# Patient Record
Sex: MALE | Race: ASIAN | NOT HISPANIC OR LATINO
[De-identification: names, ages, dates, MRNs, and addresses within clinical notes are randomized per-mention and may not be internally consistent; named-entity substitution may affect disease eponyms.]

---

## 2017-06-28 ENCOUNTER — APPOINTMENT (OUTPATIENT)
Dept: HEART AND VASCULAR | Facility: CLINIC | Age: 77
End: 2017-06-28

## 2017-09-07 ENCOUNTER — RESULT CHARGE (OUTPATIENT)
Age: 77
End: 2017-09-07

## 2017-09-08 ENCOUNTER — APPOINTMENT (OUTPATIENT)
Dept: HEART AND VASCULAR | Facility: CLINIC | Age: 77
End: 2017-09-08
Payer: MEDICARE

## 2017-09-08 VITALS
BODY MASS INDEX: 22.06 KG/M2 | WEIGHT: 127.65 LBS | DIASTOLIC BLOOD PRESSURE: 70 MMHG | OXYGEN SATURATION: 95 % | HEIGHT: 63.78 IN | TEMPERATURE: 98.4 F | SYSTOLIC BLOOD PRESSURE: 140 MMHG | HEART RATE: 72 BPM

## 2017-09-08 DIAGNOSIS — Z86.69 PERSONAL HISTORY OF OTHER DISEASES OF THE NERVOUS SYSTEM AND SENSE ORGANS: ICD-10-CM

## 2017-09-08 DIAGNOSIS — Z83.3 FAMILY HISTORY OF DIABETES MELLITUS: ICD-10-CM

## 2017-09-08 DIAGNOSIS — Z80.9 FAMILY HISTORY OF MALIGNANT NEOPLASM, UNSPECIFIED: ICD-10-CM

## 2017-09-08 DIAGNOSIS — Z78.9 OTHER SPECIFIED HEALTH STATUS: ICD-10-CM

## 2017-09-08 DIAGNOSIS — Z82.49 FAMILY HISTORY OF ISCHEMIC HEART DISEASE AND OTHER DISEASES OF THE CIRCULATORY SYSTEM: ICD-10-CM

## 2017-09-08 PROCEDURE — G0008: CPT

## 2017-09-08 PROCEDURE — 93000 ELECTROCARDIOGRAM COMPLETE: CPT

## 2017-09-08 PROCEDURE — 90662 IIV NO PRSV INCREASED AG IM: CPT

## 2017-09-08 PROCEDURE — 99214 OFFICE O/P EST MOD 30 MIN: CPT | Mod: 25

## 2017-09-08 RX ORDER — BIMATOPROST 0.1 MG/ML
0.01 SOLUTION/ DROPS OPHTHALMIC
Qty: 2 | Refills: 0 | Status: ACTIVE | COMMUNITY
Start: 2017-06-21

## 2018-07-26 PROBLEM — Z78.9 ALCOHOL USE: Status: ACTIVE | Noted: 2017-09-08

## 2018-10-31 ENCOUNTER — RX RENEWAL (OUTPATIENT)
Age: 78
End: 2018-10-31

## 2018-11-09 ENCOUNTER — RX RENEWAL (OUTPATIENT)
Age: 78
End: 2018-11-09

## 2018-11-14 ENCOUNTER — APPOINTMENT (OUTPATIENT)
Dept: HEART AND VASCULAR | Facility: CLINIC | Age: 78
End: 2018-11-14
Payer: MEDICARE

## 2018-11-14 VITALS
BODY MASS INDEX: 20.57 KG/M2 | SYSTOLIC BLOOD PRESSURE: 130 MMHG | HEART RATE: 81 BPM | DIASTOLIC BLOOD PRESSURE: 70 MMHG | WEIGHT: 121.98 LBS | OXYGEN SATURATION: 99 % | TEMPERATURE: 97.6 F | HEIGHT: 64.57 IN

## 2018-11-14 DIAGNOSIS — Z78.9 OTHER SPECIFIED HEALTH STATUS: ICD-10-CM

## 2018-11-14 PROCEDURE — G0009: CPT

## 2018-11-14 PROCEDURE — 99214 OFFICE O/P EST MOD 30 MIN: CPT

## 2018-11-14 PROCEDURE — 90662 IIV NO PRSV INCREASED AG IM: CPT

## 2018-11-14 PROCEDURE — 93000 ELECTROCARDIOGRAM COMPLETE: CPT

## 2018-11-14 PROCEDURE — G0008: CPT

## 2018-11-14 PROCEDURE — 36415 COLL VENOUS BLD VENIPUNCTURE: CPT

## 2018-11-14 PROCEDURE — 90670 PCV13 VACCINE IM: CPT

## 2018-11-14 NOTE — PHYSICAL EXAM
[General Appearance - Well Developed] : well developed [Normal Appearance] : normal appearance [Well Groomed] : well groomed [General Appearance - Well Nourished] : well nourished [No Deformities] : no deformities [General Appearance - In No Acute Distress] : no acute distress [Normal Conjunctiva] : the conjunctiva exhibited no abnormalities [Eyelids - No Xanthelasma] : the eyelids demonstrated no xanthelasmas [Normal Oral Mucosa] : normal oral mucosa [No Oral Pallor] : no oral pallor [No Oral Cyanosis] : no oral cyanosis [Normal Jugular Venous A Waves Present] : normal jugular venous A waves present [Normal Jugular Venous V Waves Present] : normal jugular venous V waves present [No Jugular Venous Avina A Waves] : no jugular venous avina A waves [Respiration, Rhythm And Depth] : normal respiratory rhythm and effort [Exaggerated Use Of Accessory Muscles For Inspiration] : no accessory muscle use [Auscultation Breath Sounds / Voice Sounds] : lungs were clear to auscultation bilaterally [Heart Rate And Rhythm] : heart rate and rhythm were normal [Heart Sounds] : normal S1 and S2 [Abdomen Soft] : soft [Abdomen Tenderness] : non-tender [Abdomen Mass (___ Cm)] : no abdominal mass palpated [Abnormal Walk] : normal gait [Gait - Sufficient For Exercise Testing] : the gait was sufficient for exercise testing [Nail Clubbing] : no clubbing of the fingernails [Cyanosis, Localized] : no localized cyanosis [Petechial Hemorrhages (___cm)] : no petechial hemorrhages [Skin Color & Pigmentation] : normal skin color and pigmentation [] : no rash [No Venous Stasis] : no venous stasis [Skin Lesions] : no skin lesions [No Skin Ulcers] : no skin ulcer [No Xanthoma] : no  xanthoma was observed [Oriented To Time, Place, And Person] : oriented to person, place, and time [Affect] : the affect was normal [Mood] : the mood was normal [No Anxiety] : not feeling anxious [FreeTextEntry1] : 1/6 apical systolic murmur

## 2018-11-14 NOTE — IMPRESSION
[FreeTextEntry1] : Normal EF and wall motion\par Normal myocardial perfusion imaging(Summed stress score of zero)\par EKG changes noted but at a very high HR, most likely a false positive. They resolve within the fisrt minute of recovery. \par

## 2018-11-14 NOTE — ASSESSMENT
[FreeTextEntry1] : SOBOE- s/p +CPET but NL MPI, HR goes very high, I favor a BB but pt defers.  It would also be good for his palps due to APCs and VPCs.  Pt reports that the SOB is much better in the cold weather.\par \par HTN- Stable\par \par HLD- Continue statin\par \par T2D- On Metformin from MD at [facility]

## 2018-11-14 NOTE — REASON FOR VISIT
[Hyperlipidemia] : hyperlipidemia [Hypertension] : hypertension [FreeTextEntry1] : originally seen for SOB\par + CPET\par (-)MPI but HR of 186, \par \par Also c/o palps, noted to be APCs and VPCs.  EF NL\par \par EKG: NSR, normal axis and intervals, no ST-Tw abnormalities. 11/14/18\par \par Flu shot today, Prevnar 13, pt reports Pneumovax with in last 10 yrs with Dr Pabon.

## 2018-11-14 NOTE — HISTORY OF PRESENT ILLNESS
[FreeTextEntry1] : formerly a pt of Dr Tan\par \par Ophtho- Dr Cho\par PCP- Dr Clayton\par GI Dr Brayden Marquis colonoscopy 2016

## 2018-11-14 NOTE — REVIEW OF SYSTEMS
[Dyspnea on exertion] : dyspnea during exertion [Palpitations] : palpitations [Joint Pain] : joint pain

## 2018-11-14 NOTE — PROCEDURE
[Tc-99m, sestamibi-Cardiolite, to 40mCi, dose-Radionuclides-[employer] code--v.1-Active-Trade] : Tc-99m, sestamibi-Cardiolite, to 40mCi, dose-Radionuclides-[employer] code--v.1-Active-Trade [ECG Atrial Arrhythmias] : yes [Normal] : no significant [de-identified] : Dr. Chan Rodriguez [de-identified] : Wilber Coulter [de-identified] : SOBOE, DM(II), HTN, HLD. [de-identified] : 10.0 [de-identified] : 27.8.cailin [de-identified] : 66 [de-identified] : 160/82 [de-identified] : 6:05 [de-identified] : 183 [de-identified] : 192/76 [de-identified] : APCs and VPCs [de-identified] : 100 [de-identified] : 64& @ rest, 69% post exercise [de-identified] : Patient motion noted

## 2018-11-15 ENCOUNTER — RX RENEWAL (OUTPATIENT)
Age: 78
End: 2018-11-15

## 2018-11-15 LAB
ALBUMIN SERPL ELPH-MCNC: 4.5 G/DL
ALP BLD-CCNC: 50 U/L
ALT SERPL-CCNC: 15 U/L
ANION GAP SERPL CALC-SCNC: 13 MMOL/L
APPEARANCE: CLEAR
AST SERPL-CCNC: 13 U/L
BACTERIA: NEGATIVE
BASOPHILS # BLD AUTO: 0.02 K/UL
BASOPHILS NFR BLD AUTO: 0.4 %
BILIRUB SERPL-MCNC: 0.5 MG/DL
BILIRUBIN URINE: NEGATIVE
BLOOD URINE: NEGATIVE
BUN SERPL-MCNC: 21 MG/DL
CALCIUM SERPL-MCNC: 10.3 MG/DL
CHLORIDE SERPL-SCNC: 102 MMOL/L
CHOLEST SERPL-MCNC: 145 MG/DL
CHOLEST/HDLC SERPL: 2.4 RATIO
CO2 SERPL-SCNC: 28 MMOL/L
COLOR: YELLOW
CREAT SERPL-MCNC: 0.81 MG/DL
EOSINOPHIL # BLD AUTO: 0.09 K/UL
EOSINOPHIL NFR BLD AUTO: 1.6 %
GLUCOSE QUALITATIVE U: NEGATIVE MG/DL
GLUCOSE SERPL-MCNC: 93 MG/DL
HBA1C MFR BLD HPLC: 5.7 %
HCT VFR BLD CALC: 43.1 %
HDLC SERPL-MCNC: 60 MG/DL
HGB BLD-MCNC: 14.3 G/DL
IMM GRANULOCYTES NFR BLD AUTO: 0.2 %
KETONES URINE: NEGATIVE
LDLC SERPL CALC-MCNC: 45 MG/DL
LEUKOCYTE ESTERASE URINE: NEGATIVE
LYMPHOCYTES # BLD AUTO: 1.51 K/UL
LYMPHOCYTES NFR BLD AUTO: 27.4 %
MAN DIFF?: NORMAL
MCHC RBC-ENTMCNC: 32.9 PG
MCHC RBC-ENTMCNC: 33.2 GM/DL
MCV RBC AUTO: 99.1 FL
MICROSCOPIC-UA: NORMAL
MONOCYTES # BLD AUTO: 0.46 K/UL
MONOCYTES NFR BLD AUTO: 8.3 %
NEUTROPHILS # BLD AUTO: 3.43 K/UL
NEUTROPHILS NFR BLD AUTO: 62.1 %
NITRITE URINE: NEGATIVE
PH URINE: 7
PLATELET # BLD AUTO: 275 K/UL
POTASSIUM SERPL-SCNC: 4.7 MMOL/L
PROT SERPL-MCNC: 6.6 G/DL
PROTEIN URINE: NEGATIVE MG/DL
RBC # BLD: 4.35 M/UL
RBC # FLD: 12.3 %
RED BLOOD CELLS URINE: 1 /HPF
SODIUM SERPL-SCNC: 143 MMOL/L
SPECIFIC GRAVITY URINE: 1.02
SQUAMOUS EPITHELIAL CELLS: 0 /HPF
TRIGL SERPL-MCNC: 200 MG/DL
UROBILINOGEN URINE: NEGATIVE MG/DL
WBC # FLD AUTO: 5.52 K/UL
WHITE BLOOD CELLS URINE: 0 /HPF

## 2019-08-21 ENCOUNTER — APPOINTMENT (OUTPATIENT)
Dept: HEART AND VASCULAR | Facility: CLINIC | Age: 79
End: 2019-08-21
Payer: MEDICARE

## 2019-08-21 VITALS
HEIGHT: 64 IN | OXYGEN SATURATION: 97 % | DIASTOLIC BLOOD PRESSURE: 60 MMHG | HEART RATE: 83 BPM | WEIGHT: 123 LBS | SYSTOLIC BLOOD PRESSURE: 110 MMHG | TEMPERATURE: 98 F | BODY MASS INDEX: 21 KG/M2 | RESPIRATION RATE: 14 BRPM

## 2019-08-21 PROCEDURE — 93000 ELECTROCARDIOGRAM COMPLETE: CPT

## 2019-08-21 PROCEDURE — 99214 OFFICE O/P EST MOD 30 MIN: CPT

## 2019-08-21 PROCEDURE — 36415 COLL VENOUS BLD VENIPUNCTURE: CPT

## 2019-08-21 RX ORDER — AMILORIDE HYDROCHLORIDE 5 MG/1
5 TABLET ORAL
Qty: 90 | Refills: 3 | Status: DISCONTINUED | COMMUNITY
Start: 2018-11-15 | End: 2019-08-21

## 2019-08-21 RX ORDER — TRAVOPROST 0.04 MG/ML
0 SOLUTION/ DROPS OPHTHALMIC
Qty: 2 | Refills: 0 | Status: DISCONTINUED | COMMUNITY
Start: 2017-05-02 | End: 2019-08-21

## 2019-08-21 NOTE — REASON FOR VISIT
[Hyperlipidemia] : hyperlipidemia [Hypertension] : hypertension [FreeTextEntry1] : originally seen for SOB\par + CPET\par (-)MPI but HR of 186 with brief EKG changes, \par \par Also c/o palps, noted to be APCs and VPCs.  EF NL\par \par EKG: NSR, normal axis and intervals, new ST-Tw abnormalities in V4,5,6.  8/21/19.  \par \par Flu shot today, Prevnar 13, pt reports Pneumovax with in last 10 yrs with Dr Pabon.

## 2019-08-21 NOTE — IMPRESSION
[FreeTextEntry1] : Normal EF and wall motion\par Normal myocardial perfusion imaging(Summed stress score of zero)\par EKG changes noted but at a very high HR, most likely a false positive. They resolve within the first minute of recovery. \par

## 2019-08-21 NOTE — ASSESSMENT
[FreeTextEntry1] : SOBOE- s/p +CPET but NL MPI, HR goes very high, I favor a BB but pt defers.  It would also be good for his palps due to APCs and VPCs.  Pt reports that the SOB is much better in the cold weather.   More fatigue, new Tw changes in V 4-6.  favor updating Nuc.  He also wants to join a gym\par \par HTN-  Low today, bren reduce diuretic to QOD\par \par HLD- Continue statin\par \par T2D- On Metformin from MD at [facility]

## 2019-08-21 NOTE — PROCEDURE
[Tc-99m, sestamibi-Cardiolite, to 40mCi, dose-Radionuclides-[employer] code--v.1-Active-Trade] : Tc-99m, sestamibi-Cardiolite, to 40mCi, dose-Radionuclides-[employer] code--v.1-Active-Trade [ECG Atrial Arrhythmias] : yes [Normal] : no significant [de-identified] : Dr. Chan Rodriguez [de-identified] : Wilber Coulter [de-identified] : SOBOE, DM(II), HTN, HLD. [de-identified] : 10.0 [de-identified] : 27.8.cailin [de-identified] : 66 [de-identified] : 160/82 [de-identified] : 183 [de-identified] : 6:05 [de-identified] : 192/76 [de-identified] : APCs and VPCs [de-identified] : 100 [de-identified] : 64& @ rest, 69% post exercise [de-identified] : Patient motion noted

## 2019-08-21 NOTE — PHYSICAL EXAM
[General Appearance - Well Developed] : well developed [Well Groomed] : well groomed [Normal Appearance] : normal appearance [General Appearance - Well Nourished] : well nourished [No Deformities] : no deformities [General Appearance - In No Acute Distress] : no acute distress [Normal Conjunctiva] : the conjunctiva exhibited no abnormalities [Eyelids - No Xanthelasma] : the eyelids demonstrated no xanthelasmas [Normal Oral Mucosa] : normal oral mucosa [No Oral Pallor] : no oral pallor [No Oral Cyanosis] : no oral cyanosis [Normal Jugular Venous A Waves Present] : normal jugular venous A waves present [No Jugular Venous Avina A Waves] : no jugular venous avina A waves [Normal Jugular Venous V Waves Present] : normal jugular venous V waves present [Respiration, Rhythm And Depth] : normal respiratory rhythm and effort [Auscultation Breath Sounds / Voice Sounds] : lungs were clear to auscultation bilaterally [Exaggerated Use Of Accessory Muscles For Inspiration] : no accessory muscle use [Heart Rate And Rhythm] : heart rate and rhythm were normal [Heart Sounds] : normal S1 and S2 [Abdomen Soft] : soft [Abdomen Tenderness] : non-tender [Abdomen Mass (___ Cm)] : no abdominal mass palpated [Gait - Sufficient For Exercise Testing] : the gait was sufficient for exercise testing [Abnormal Walk] : normal gait [Nail Clubbing] : no clubbing of the fingernails [Cyanosis, Localized] : no localized cyanosis [Petechial Hemorrhages (___cm)] : no petechial hemorrhages [Skin Color & Pigmentation] : normal skin color and pigmentation [No Venous Stasis] : no venous stasis [] : no rash [No Skin Ulcers] : no skin ulcer [Skin Lesions] : no skin lesions [No Xanthoma] : no  xanthoma was observed [Affect] : the affect was normal [Oriented To Time, Place, And Person] : oriented to person, place, and time [Mood] : the mood was normal [No Anxiety] : not feeling anxious [FreeTextEntry1] : lenses

## 2019-08-22 LAB
ALBUMIN SERPL ELPH-MCNC: 4.7 G/DL
ALP BLD-CCNC: 62 U/L
ALT SERPL-CCNC: 18 U/L
ANION GAP SERPL CALC-SCNC: 17 MMOL/L
AST SERPL-CCNC: 16 U/L
BASOPHILS # BLD AUTO: 0.04 K/UL
BASOPHILS NFR BLD AUTO: 0.6 %
BILIRUB SERPL-MCNC: 0.3 MG/DL
BUN SERPL-MCNC: 21 MG/DL
CALCIUM SERPL-MCNC: 10.4 MG/DL
CHLORIDE SERPL-SCNC: 99 MMOL/L
CHOLEST SERPL-MCNC: 145 MG/DL
CHOLEST/HDLC SERPL: 2.9 RATIO
CK SERPL-CCNC: 51 U/L
CO2 SERPL-SCNC: 24 MMOL/L
CREAT SERPL-MCNC: 0.96 MG/DL
EOSINOPHIL # BLD AUTO: 0.1 K/UL
EOSINOPHIL NFR BLD AUTO: 1.5 %
ERYTHROCYTE [SEDIMENTATION RATE] IN BLOOD BY WESTERGREN METHOD: 7 MM/HR
ESTIMATED AVERAGE GLUCOSE: 120 MG/DL
GLUCOSE SERPL-MCNC: 104 MG/DL
HBA1C MFR BLD HPLC: 5.8 %
HCT VFR BLD CALC: 42.3 %
HDLC SERPL-MCNC: 50 MG/DL
HGB BLD-MCNC: 14.5 G/DL
IMM GRANULOCYTES NFR BLD AUTO: 0.3 %
LDLC SERPL CALC-MCNC: 55 MG/DL
LYMPHOCYTES # BLD AUTO: 1.22 K/UL
LYMPHOCYTES NFR BLD AUTO: 17.8 %
MAN DIFF?: NORMAL
MCHC RBC-ENTMCNC: 32.7 PG
MCHC RBC-ENTMCNC: 34.3 GM/DL
MCV RBC AUTO: 95.3 FL
MONOCYTES # BLD AUTO: 0.61 K/UL
MONOCYTES NFR BLD AUTO: 8.9 %
NEUTROPHILS # BLD AUTO: 4.87 K/UL
NEUTROPHILS NFR BLD AUTO: 70.9 %
PLATELET # BLD AUTO: 255 K/UL
POTASSIUM SERPL-SCNC: 4.4 MMOL/L
PROT SERPL-MCNC: 6.7 G/DL
RBC # BLD: 4.44 M/UL
RBC # FLD: 11.6 %
SODIUM SERPL-SCNC: 140 MMOL/L
TRIGL SERPL-MCNC: 200 MG/DL
TSH SERPL-ACNC: 1.31 UIU/ML
WBC # FLD AUTO: 6.86 K/UL

## 2019-08-28 ENCOUNTER — APPOINTMENT (OUTPATIENT)
Dept: HEART AND VASCULAR | Facility: CLINIC | Age: 79
End: 2019-08-28
Payer: MEDICARE

## 2019-08-28 VITALS — BODY MASS INDEX: 21 KG/M2 | WEIGHT: 123 LBS | HEIGHT: 64 IN

## 2019-08-28 PROCEDURE — 78452 HT MUSCLE IMAGE SPECT MULT: CPT

## 2019-08-28 PROCEDURE — 93015 CV STRESS TEST SUPVJ I&R: CPT

## 2019-08-28 PROCEDURE — A9500: CPT

## 2020-01-28 ENCOUNTER — RX RENEWAL (OUTPATIENT)
Age: 80
End: 2020-01-28

## 2020-07-28 ENCOUNTER — APPOINTMENT (OUTPATIENT)
Dept: HEART AND VASCULAR | Facility: CLINIC | Age: 80
End: 2020-07-28
Payer: MEDICARE

## 2020-07-28 VITALS
WEIGHT: 120 LBS | SYSTOLIC BLOOD PRESSURE: 122 MMHG | DIASTOLIC BLOOD PRESSURE: 70 MMHG | HEART RATE: 89 BPM | BODY MASS INDEX: 20.49 KG/M2 | TEMPERATURE: 98.4 F | HEIGHT: 64.17 IN | OXYGEN SATURATION: 96 %

## 2020-07-28 DIAGNOSIS — R06.02 SHORTNESS OF BREATH: ICD-10-CM

## 2020-07-28 PROCEDURE — 36415 COLL VENOUS BLD VENIPUNCTURE: CPT

## 2020-07-28 PROCEDURE — G0009: CPT

## 2020-07-28 PROCEDURE — 99214 OFFICE O/P EST MOD 30 MIN: CPT

## 2020-07-28 PROCEDURE — 93000 ELECTROCARDIOGRAM COMPLETE: CPT

## 2020-07-28 PROCEDURE — 90732 PPSV23 VACC 2 YRS+ SUBQ/IM: CPT

## 2020-07-28 NOTE — PROCEDURE
[Tc-99m, sestamibi-Cardiolite, to 40mCi, dose-Radionuclides-[employer] code--v.1-Active-Trade] : Tc-99m, sestamibi-Cardiolite, to 40mCi, dose-Radionuclides-[employer] code--v.1-Active-Trade [ECG Atrial Arrhythmias] : yes [Normal] : no significant [de-identified] : SOBOE, DM(II), HTN, HLD. [de-identified] : Dr. Chan Rodriguez [de-identified] : Wilber Coulter [de-identified] : 27.8.cailin [de-identified] : 10.0 [de-identified] : 66 [de-identified] : 160/82 [de-identified] : 6:05 [de-identified] : 192/76 [de-identified] : 183 [de-identified] : APCs and VPCs [de-identified] : 100 [de-identified] : 64& @ rest, 69% post exercise [de-identified] : Patient motion noted [TWNoteComboBox1] : HAMIDA [TWNoteComboBox2] : Edwin [TWNoteComboBox4] : fatigue [TWNoteComboBox5] : dyspnea [TWNoteComboBox3] : 3 [TWNoteComboBox7] : horizontal ST depression [TWNoteComboBox6] : 1.0 - 1.5

## 2020-07-28 NOTE — ASSESSMENT
[FreeTextEntry1] : SOBOE- s/p +CPET but NL MPI, HR goes very high, I favor a BB but pt defers.  It would also be good for his palps due to APCs and VPCs.  Pt reports that the SOB is much better in the cold weather.   More fatigue, new Tw changes in V 4-6.  favor updating Nuc.  He also wants to join a gym.  NL Nuc Aug 2019.\par \par HTN-  Low, will reduce diuretic to QOD.  BP excellent\par \par HLD- Continue statin\par \par T2D- On Metformin from MD at [facility]

## 2020-07-28 NOTE — PHYSICAL EXAM
[General Appearance - Well Developed] : well developed [Normal Appearance] : normal appearance [Well Groomed] : well groomed [General Appearance - Well Nourished] : well nourished [No Deformities] : no deformities [General Appearance - In No Acute Distress] : no acute distress [Normal Conjunctiva] : the conjunctiva exhibited no abnormalities [Eyelids - No Xanthelasma] : the eyelids demonstrated no xanthelasmas [Normal Oral Mucosa] : normal oral mucosa [No Oral Pallor] : no oral pallor [No Oral Cyanosis] : no oral cyanosis [Normal Jugular Venous A Waves Present] : normal jugular venous A waves present [Normal Jugular Venous V Waves Present] : normal jugular venous V waves present [No Jugular Venous Avina A Waves] : no jugular venous avina A waves [Respiration, Rhythm And Depth] : normal respiratory rhythm and effort [Exaggerated Use Of Accessory Muscles For Inspiration] : no accessory muscle use [Auscultation Breath Sounds / Voice Sounds] : lungs were clear to auscultation bilaterally [Heart Rate And Rhythm] : heart rate and rhythm were normal [Heart Sounds] : normal S1 and S2 [Abdomen Tenderness] : non-tender [Abdomen Soft] : soft [Abdomen Mass (___ Cm)] : no abdominal mass palpated [Abnormal Walk] : normal gait [Gait - Sufficient For Exercise Testing] : the gait was sufficient for exercise testing [Nail Clubbing] : no clubbing of the fingernails [Cyanosis, Localized] : no localized cyanosis [Petechial Hemorrhages (___cm)] : no petechial hemorrhages [] : no rash [Skin Color & Pigmentation] : normal skin color and pigmentation [No Venous Stasis] : no venous stasis [Skin Lesions] : no skin lesions [No Skin Ulcers] : no skin ulcer [No Xanthoma] : no  xanthoma was observed [Oriented To Time, Place, And Person] : oriented to person, place, and time [Affect] : the affect was normal [Mood] : the mood was normal [No Anxiety] : not feeling anxious [FreeTextEntry1] : 1/6 apical systolic murmur

## 2020-07-28 NOTE — REASON FOR VISIT
[Hyperlipidemia] : hyperlipidemia [Hypertension] : hypertension [FreeTextEntry1] : originally seen for SOB\par + CPET\par (-)MPI but HR of 186 with brief EKG changes, \par \par Also c/o palps, noted to be APCs and VPCs.  EF NL\par \par EKG: NSR, normal axis and intervals, new ST-Tw abnormalities in V4,5,6.  8/21/19.  7/28/20\par \par Flu shot 2018, Prevnar 13, pt reports Pneumovax with in last 13 yrs with Dr Pabon. Will give today 7/28/20.

## 2020-07-29 LAB
ALBUMIN SERPL ELPH-MCNC: 4.8 G/DL
ALP BLD-CCNC: 53 U/L
ALT SERPL-CCNC: 16 U/L
ANION GAP SERPL CALC-SCNC: 16 MMOL/L
AST SERPL-CCNC: 19 U/L
BASOPHILS # BLD AUTO: 0.03 K/UL
BASOPHILS NFR BLD AUTO: 0.5 %
BILIRUB SERPL-MCNC: 0.4 MG/DL
BUN SERPL-MCNC: 25 MG/DL
CALCIUM SERPL-MCNC: 9.8 MG/DL
CHLORIDE SERPL-SCNC: 103 MMOL/L
CHOLEST SERPL-MCNC: 136 MG/DL
CHOLEST/HDLC SERPL: 2.5 RATIO
CO2 SERPL-SCNC: 23 MMOL/L
CREAT SERPL-MCNC: 1.01 MG/DL
EOSINOPHIL # BLD AUTO: 0.06 K/UL
EOSINOPHIL NFR BLD AUTO: 1.1 %
ESTIMATED AVERAGE GLUCOSE: 117 MG/DL
GLUCOSE SERPL-MCNC: 120 MG/DL
HBA1C MFR BLD HPLC: 5.7 %
HCT VFR BLD CALC: 44.6 %
HDLC SERPL-MCNC: 56 MG/DL
HGB BLD-MCNC: 14.5 G/DL
IMM GRANULOCYTES NFR BLD AUTO: 0.2 %
LDLC SERPL CALC-MCNC: 43 MG/DL
LYMPHOCYTES # BLD AUTO: 1.41 K/UL
LYMPHOCYTES NFR BLD AUTO: 25.8 %
MAN DIFF?: NORMAL
MCHC RBC-ENTMCNC: 31.7 PG
MCHC RBC-ENTMCNC: 32.5 GM/DL
MCV RBC AUTO: 97.6 FL
MONOCYTES # BLD AUTO: 0.72 K/UL
MONOCYTES NFR BLD AUTO: 13.2 %
NEUTROPHILS # BLD AUTO: 3.23 K/UL
NEUTROPHILS NFR BLD AUTO: 59.2 %
PLATELET # BLD AUTO: 248 K/UL
POTASSIUM SERPL-SCNC: 4 MMOL/L
PROT SERPL-MCNC: 6.8 G/DL
RBC # BLD: 4.57 M/UL
RBC # FLD: 11.9 %
SODIUM SERPL-SCNC: 142 MMOL/L
T3 SERPL-MCNC: 84 NG/DL
T4 SERPL-MCNC: 5.9 UG/DL
TRIGL SERPL-MCNC: 188 MG/DL
TSH SERPL-ACNC: 1.31 UIU/ML
WBC # FLD AUTO: 5.46 K/UL

## 2020-10-28 ENCOUNTER — APPOINTMENT (OUTPATIENT)
Dept: HEART AND VASCULAR | Facility: CLINIC | Age: 80
End: 2020-10-28
Payer: MEDICARE

## 2020-10-28 VITALS
BODY MASS INDEX: 21 KG/M2 | SYSTOLIC BLOOD PRESSURE: 140 MMHG | WEIGHT: 123 LBS | DIASTOLIC BLOOD PRESSURE: 90 MMHG | OXYGEN SATURATION: 98 % | HEIGHT: 64.17 IN | HEART RATE: 98 BPM | TEMPERATURE: 97 F

## 2020-10-28 VITALS — DIASTOLIC BLOOD PRESSURE: 70 MMHG | SYSTOLIC BLOOD PRESSURE: 116 MMHG

## 2020-10-28 PROCEDURE — 99214 OFFICE O/P EST MOD 30 MIN: CPT

## 2020-10-28 NOTE — HISTORY OF PRESENT ILLNESS
[FreeTextEntry1] : formerly a pt of Dr Tan\par \par Ophtho- Dr Cho\par PCP- Dr Clayton\par GI Dr Brayden Marquis colonoscopy 2016\par Neuro-  Dr Campbell @ [facility]  ? hydrocephalus

## 2020-10-28 NOTE — PROCEDURE
[Tc-99m, sestamibi-Cardiolite, to 40mCi, dose-Radionuclides-[employer] code--v.1-Active-Trade] : Tc-99m, sestamibi-Cardiolite, to 40mCi, dose-Radionuclides-[employer] code--v.1-Active-Trade [ECG Atrial Arrhythmias] : yes [Normal] : no significant [de-identified] : Dr. Chan Rodriguez [de-identified] : Wilber Coulter [de-identified] : SOBOE, DM(II), HTN, HLD. [de-identified] : 10.0 [de-identified] : 27.8.cailin [de-identified] : 66 [de-identified] : 160/82 [de-identified] : 6:05 [de-identified] : 183 [de-identified] : 192/76 [de-identified] : APCs and VPCs [de-identified] : 100 [de-identified] : 64& @ rest, 69% post exercise [de-identified] : Patient motion noted [TWNoteComboBox1] : HAMIDA [TWNoteComboBox2] : Edwin [TWNoteComboBox4] : fatigue [TWNoteComboBox3] : 3 [TWNoteComboBox5] : dyspnea [TWNoteComboBox6] : 1.0 - 1.5 [TWNoteComboBox7] : horizontal ST depression

## 2020-10-28 NOTE — REASON FOR VISIT
[Hyperlipidemia] : hyperlipidemia [Hypertension] : hypertension [FreeTextEntry1] : originally seen for SOB\par + CPET\par (-)MPI but HR of 186 with brief EKG changes, \par \par Also c/o palps, noted to be APCs and VPCs.  EF NL\par \par EKG: NSR, normal axis and intervals, new ST-Tw abnormalities in V4,5,6.  8/21/19.  7/28/20\par \par Flu shot 2018, Prevnar 13, pt reports Pneumovax with in last 13 yrs with Dr Pabon. Will give today 7/28/20.\par 10/28/20 fixating on low temperature.  BP up. Due for 2nd Shingles shot in Nov, had flu shot.

## 2020-10-28 NOTE — ASSESSMENT
[FreeTextEntry1] : SOBOE- s/p +CPET but NL MPI, HR goes very high, I favor a BB but pt defers.  It would also be good for his palps due to APCs and VPCs.  Pt reports that the SOB is much better in the cold weather.   More fatigue, new Tw changes in V 4-6.  favor updating Nuc.  He also wants to join a gym.  NL Nuc Aug 2019.\par \par HTN-  Low, will reduce diuretic to QOD.  BP excellent. Will increase back to daily for elevated BP\par \par HLD- Continue statin\par \par T2D- On Metformin from MD at [facility]

## 2020-11-02 ENCOUNTER — RX RENEWAL (OUTPATIENT)
Age: 80
End: 2020-11-02

## 2020-12-02 ENCOUNTER — RX RENEWAL (OUTPATIENT)
Age: 80
End: 2020-12-02

## 2021-02-04 ENCOUNTER — APPOINTMENT (OUTPATIENT)
Dept: HEART AND VASCULAR | Facility: CLINIC | Age: 81
End: 2021-02-04
Payer: MEDICARE

## 2021-02-04 ENCOUNTER — NON-APPOINTMENT (OUTPATIENT)
Age: 81
End: 2021-02-04

## 2021-02-04 VITALS
DIASTOLIC BLOOD PRESSURE: 80 MMHG | BODY MASS INDEX: 20.49 KG/M2 | TEMPERATURE: 98.2 F | WEIGHT: 120 LBS | SYSTOLIC BLOOD PRESSURE: 150 MMHG | HEART RATE: 91 BPM | OXYGEN SATURATION: 96 % | HEIGHT: 64.17 IN

## 2021-02-04 PROCEDURE — 99213 OFFICE O/P EST LOW 20 MIN: CPT

## 2021-02-04 PROCEDURE — 93000 ELECTROCARDIOGRAM COMPLETE: CPT

## 2021-02-04 NOTE — REASON FOR VISIT
[Hyperlipidemia] : hyperlipidemia [Hypertension] : hypertension [FreeTextEntry1] : originally seen for SOB\par + CPET\par (-)MPI but HR of 186 with brief EKG changes, \par \par Also c/o palps, noted to be APCs and VPCs.  EF NL\par \par EKG: NSR, normal axis and intervals, new ST-Tw abnormalities in V4,5,6.  8/21/19.  7/28/20\par \par Flu shot 2018, Prevnar 13, pt reports Pneumovax with in last 13 yrs with Dr Pabon. Will give today 7/28/20.\par 10/28/20 fixating on low temperature.  BP up. Due for 2nd Shingles shot in Nov, had flu shot. \par 2/4/21   Vague CP, not exertional, worse when immobile at home.

## 2021-02-04 NOTE — PHYSICAL EXAM
[General Appearance - Well Developed] : well developed [Normal Appearance] : normal appearance [Well Groomed] : well groomed [General Appearance - Well Nourished] : well nourished [No Deformities] : no deformities [General Appearance - In No Acute Distress] : no acute distress [Normal Conjunctiva] : the conjunctiva exhibited no abnormalities [Eyelids - No Xanthelasma] : the eyelids demonstrated no xanthelasmas [Normal Oral Mucosa] : normal oral mucosa [No Oral Pallor] : no oral pallor [No Oral Cyanosis] : no oral cyanosis [Normal Jugular Venous A Waves Present] : normal jugular venous A waves present [Normal Jugular Venous V Waves Present] : normal jugular venous V waves present [No Jugular Venous Avian A Waves] : no jugular venous avina A waves [Respiration, Rhythm And Depth] : normal respiratory rhythm and effort [Exaggerated Use Of Accessory Muscles For Inspiration] : no accessory muscle use [Auscultation Breath Sounds / Voice Sounds] : lungs were clear to auscultation bilaterally [Heart Rate And Rhythm] : heart rate and rhythm were normal [Heart Sounds] : normal S1 and S2 [Abdomen Soft] : soft [Abdomen Tenderness] : non-tender [Abdomen Mass (___ Cm)] : no abdominal mass palpated [Abnormal Walk] : normal gait [Gait - Sufficient For Exercise Testing] : the gait was sufficient for exercise testing [Nail Clubbing] : no clubbing of the fingernails [Cyanosis, Localized] : no localized cyanosis [Petechial Hemorrhages (___cm)] : no petechial hemorrhages [Skin Color & Pigmentation] : normal skin color and pigmentation [] : no rash [No Venous Stasis] : no venous stasis [Skin Lesions] : no skin lesions [No Skin Ulcers] : no skin ulcer [No Xanthoma] : no  xanthoma was observed [Oriented To Time, Place, And Person] : oriented to person, place, and time [Affect] : the affect was normal [Mood] : the mood was normal [No Anxiety] : not feeling anxious [FreeTextEntry1] : 1/6 apical systolic murmur

## 2021-02-04 NOTE — PROCEDURE
[Tc-99m, sestamibi-Cardiolite, to 40mCi, dose-Radionuclides-[employer] code--v.1-Active-Trade] : Tc-99m, sestamibi-Cardiolite, to 40mCi, dose-Radionuclides-[employer] code--v.1-Active-Trade [ECG Atrial Arrhythmias] : yes [Normal] : no significant [de-identified] : Dr. Chan Rodriguez [de-identified] : Wilber Coulter [de-identified] : SOBOE, DM(II), HTN, HLD. [de-identified] : 10.0 [de-identified] : 27.8.cailin [de-identified] : 66 [de-identified] : 160/82 [de-identified] : 6:05 [de-identified] : 183 [de-identified] : 192/76 [de-identified] : APCs and VPCs [de-identified] : 100 [de-identified] : 64& @ rest, 69% post exercise [de-identified] : Patient motion noted [TWNoteComboBox1] : HAMIDA [TWNoteComboBox2] : Edwin [TWNoteComboBox4] : fatigue [TWNoteComboBox3] : 3 [TWNoteComboBox5] : dyspnea [TWNoteComboBox6] : 1.0 - 1.5 [TWNoteComboBox7] : horizontal ST depression

## 2021-02-04 NOTE — HISTORY OF PRESENT ILLNESS
[FreeTextEntry1] : formerly a pt of Dr Tan\par \par Ophtho- Dr Cho\par PCP- Dr Clayton\par GI Dr Brayden Marquis colonoscopy 2016\par Neuro-  Dr Campbell @ [facility]  ? hydrocephalus

## 2021-02-04 NOTE — ASSESSMENT
[FreeTextEntry1] : SOBOE- s/p +CPET but NL MPI, HR goes very high, I favor a BB but pt defers.  It would also be good for his palps due to APCs and VPCs.  Pt reports that the SOB is much better in the cold weather.   More fatigue, new Tw changes in V 4-6.  favor updating Nuc.  He also wants to join a gym.  NL Nuc Aug 2019.\par \par HTN-  Low, will reduce diuretic to QOD.  BP excellent. Will increase back to daily for elevated BP.  Many questions answered today, pt also reassured.\par \par HLD- Continue statin\par \par T2D- On Metformin from MD at [facility]

## 2021-10-19 ENCOUNTER — RX RENEWAL (OUTPATIENT)
Age: 81
End: 2021-10-19

## 2021-11-23 ENCOUNTER — APPOINTMENT (OUTPATIENT)
Dept: HEART AND VASCULAR | Facility: CLINIC | Age: 81
End: 2021-11-23
Payer: MEDICARE

## 2021-11-23 ENCOUNTER — NON-APPOINTMENT (OUTPATIENT)
Age: 81
End: 2021-11-23

## 2021-11-23 VITALS
OXYGEN SATURATION: 98 % | DIASTOLIC BLOOD PRESSURE: 67 MMHG | BODY MASS INDEX: 21.17 KG/M2 | HEART RATE: 80 BPM | WEIGHT: 123.99 LBS | SYSTOLIC BLOOD PRESSURE: 120 MMHG | HEIGHT: 64 IN | TEMPERATURE: 97.7 F

## 2021-11-23 PROCEDURE — 99213 OFFICE O/P EST LOW 20 MIN: CPT

## 2021-11-23 PROCEDURE — 93000 ELECTROCARDIOGRAM COMPLETE: CPT

## 2021-11-23 RX ORDER — KRILL/OM-3/DHA/EPA/PHOSPHO/AST 1000-230MG
81 CAPSULE ORAL
Qty: 90 | Refills: 0 | Status: ACTIVE | COMMUNITY
Start: 2021-11-23

## 2021-11-23 NOTE — HISTORY OF PRESENT ILLNESS
[FreeTextEntry1] : formerly a pt of Dr Tan\par \par Ophtho- Dr Cho\par PCP- Dr Clayton\par GI Dr Brayden Marquis colonoscopy 2016\par Neuro-  Dr Campbell @ [facility]  ? hydrocephalus\par LEONARDO- Dr Yu\par - Dr Quevedo

## 2021-11-23 NOTE — PROCEDURE
[Tc-99m, sestamibi-Cardiolite, to 40mCi, dose-Radionuclides-[employer] code--v.1-Active-Trade] : Tc-99m, sestamibi-Cardiolite, to 40mCi, dose-Radionuclides-[employer] code--v.1-Active-Trade [ECG Atrial Arrhythmias] : yes [Normal] : no significant [de-identified] : Dr. Chan Rodriguez [de-identified] : Wilber Coulter [de-identified] : SOBOE, DM(II), HTN, HLD. [de-identified] : 10.0 [de-identified] : 27.8.cailin [de-identified] : 66 [de-identified] : 160/82 [de-identified] : 6:05 [de-identified] : 183 [de-identified] : 192/76 [de-identified] : APCs and VPCs [de-identified] : 100 [de-identified] : 64& @ rest, 69% post exercise [de-identified] : Patient motion noted [TWNoteComboBox1] : HAMIDA [TWNoteComboBox2] : Edwin [TWNoteComboBox4] : fatigue [TWNoteComboBox3] : 3 [TWNoteComboBox5] : dyspnea [TWNoteComboBox6] : 1.0 - 1.5 [TWNoteComboBox7] : horizontal ST depression

## 2021-11-23 NOTE — ASSESSMENT
[FreeTextEntry1] : SOBOE- s/p +CPET but NL MPI, HR goes very high, I favor a BB but pt defers.  It would also be good for his palps due to APCs and VPCs.  Pt reports that the SOB is much better in the cold weather.   More fatigue, new Tw changes in V 4-6.  favor updating Nuc.  He also wants to join a gym.  NL Nuc Aug 2019.\par \par HTN-  Low, will reduce diuretic to QOD.  BP excellent. Will increase back to daily for elevated BP.  Many questions answered today, pt also reassured.\par \par HLD- Continue statin\par \par T2D- On Metformin from MD at [facility]\par \par Pt reports he is now anemic and getting w/u at [facility] for weakness.  I have no results yet he is asking questions about tests he had.  I told him he checks out well here but I need his test results before I can answer any questions, very frustrating visit for me.

## 2021-12-03 ENCOUNTER — RX RENEWAL (OUTPATIENT)
Age: 81
End: 2021-12-03

## 2022-04-05 ENCOUNTER — RX RENEWAL (OUTPATIENT)
Age: 82
End: 2022-04-05

## 2022-05-18 ENCOUNTER — NON-APPOINTMENT (OUTPATIENT)
Age: 82
End: 2022-05-18

## 2022-05-18 ENCOUNTER — APPOINTMENT (OUTPATIENT)
Dept: HEART AND VASCULAR | Facility: CLINIC | Age: 82
End: 2022-05-18
Payer: MEDICARE

## 2022-05-18 VITALS
HEIGHT: 64 IN | HEART RATE: 98 BPM | DIASTOLIC BLOOD PRESSURE: 78 MMHG | OXYGEN SATURATION: 97 % | TEMPERATURE: 98.2 F | BODY MASS INDEX: 20.31 KG/M2 | SYSTOLIC BLOOD PRESSURE: 130 MMHG | WEIGHT: 118.98 LBS

## 2022-05-18 VITALS
BODY MASS INDEX: 20.31 KG/M2 | TEMPERATURE: 98.1 F | OXYGEN SATURATION: 97 % | HEART RATE: 98 BPM | SYSTOLIC BLOOD PRESSURE: 130 MMHG | HEIGHT: 64 IN | DIASTOLIC BLOOD PRESSURE: 78 MMHG | WEIGHT: 118.98 LBS

## 2022-05-18 VITALS — DIASTOLIC BLOOD PRESSURE: 66 MMHG | SYSTOLIC BLOOD PRESSURE: 108 MMHG

## 2022-05-18 PROCEDURE — 36415 COLL VENOUS BLD VENIPUNCTURE: CPT

## 2022-05-18 PROCEDURE — 99213 OFFICE O/P EST LOW 20 MIN: CPT

## 2022-05-18 PROCEDURE — 93000 ELECTROCARDIOGRAM COMPLETE: CPT

## 2022-05-18 NOTE — PROCEDURE
[Tc-99m, sestamibi-Cardiolite, to 40mCi, dose-Radionuclides-[employer] code--v.1-Active-Trade] : Tc-99m, sestamibi-Cardiolite, to 40mCi, dose-Radionuclides-[employer] code--v.1-Active-Trade [ECG Atrial Arrhythmias] : yes [Normal] : no significant [de-identified] : Dr. Chan Rodriguez [de-identified] : Wilber Coulter [de-identified] : SOBOE, DM(II), HTN, HLD. [de-identified] : 10.0 [de-identified] : 27.8.cailin [de-identified] : 66 [de-identified] : 160/82 [de-identified] : 6:05 [de-identified] : 183 [de-identified] : 192/76 [de-identified] : APCs and VPCs [de-identified] : 100 [de-identified] : 64& @ rest, 69% post exercise [de-identified] : Patient motion noted [TWNoteComboBox1] : HAMIDA [TWNoteComboBox2] : Edwin [TWNoteComboBox4] : fatigue [TWNoteComboBox3] : 3 [TWNoteComboBox5] : dyspnea [TWNoteComboBox6] : 1.0 - 1.5 [TWNoteComboBox7] : horizontal ST depression

## 2022-05-18 NOTE — HISTORY OF PRESENT ILLNESS
[FreeTextEntry1] : formerly a pt of Dr Tan\par \par Ophtho- Dr Cho\par PCP- Dr Clayton\par GI Dr Brayden Marquis colonoscopy 2016\par Neuro-  Dr Campbell @ [facility]  ? hydrocephalus\par LEONARDO- Dr Yu\par - Dr Quevedo

## 2022-05-18 NOTE — ASSESSMENT
[FreeTextEntry1] : SOBOE- s/p +CPET but NL MPI, HR goes very high, I favor a BB but pt defers.  It would also be good for his palps due to APCs and VPCs.  Pt reports that the SOB is much better in the cold weather.   More fatigue, new Tw changes in V 4-6.  favor updating Nuc.  He also wants to join a gym.  NL Nuc Aug 2019.\par \par HTN-  Low, will reduce diuretic to QOD.  BP excellent. Will increase back to daily for elevated BP.  Many questions answered today, pt also reassured.\par \par HLD- Continue statin\par \par T2D- On Metformin from MD at [facility], dose reduced.  Labs were drawn today in Office. \par \par Pt reports he is now anemic and getting w/u at [facility] for weakness.  I have no results yet he is asking questions about tests he had.  I told him he checks out well here but I need his test results before I can answer any questions, very frustrating visit for me.  Resolved.

## 2022-05-19 LAB
ALBUMIN SERPL ELPH-MCNC: 4.7 G/DL
ALP BLD-CCNC: 59 U/L
ALT SERPL-CCNC: 16 U/L
ANION GAP SERPL CALC-SCNC: 16 MMOL/L
AST SERPL-CCNC: 20 U/L
BASOPHILS # BLD AUTO: 0.02 K/UL
BASOPHILS NFR BLD AUTO: 0.3 %
BILIRUB SERPL-MCNC: 0.4 MG/DL
BUN SERPL-MCNC: 29 MG/DL
CALCIUM SERPL-MCNC: 10.1 MG/DL
CHLORIDE SERPL-SCNC: 96 MMOL/L
CHOLEST SERPL-MCNC: 149 MG/DL
CO2 SERPL-SCNC: 24 MMOL/L
CREAT SERPL-MCNC: 1.02 MG/DL
EGFR: 74 ML/MIN/1.73M2
EOSINOPHIL # BLD AUTO: 0.08 K/UL
EOSINOPHIL NFR BLD AUTO: 1.2 %
ESTIMATED AVERAGE GLUCOSE: 120 MG/DL
GLUCOSE SERPL-MCNC: 117 MG/DL
HBA1C MFR BLD HPLC: 5.8 %
HCT VFR BLD CALC: 40 %
HDLC SERPL-MCNC: 53 MG/DL
HGB BLD-MCNC: 13.3 G/DL
IMM GRANULOCYTES NFR BLD AUTO: 0 %
LDLC SERPL CALC-MCNC: 28 MG/DL
LYMPHOCYTES # BLD AUTO: 1.48 K/UL
LYMPHOCYTES NFR BLD AUTO: 22.8 %
MAN DIFF?: NORMAL
MCHC RBC-ENTMCNC: 32.4 PG
MCHC RBC-ENTMCNC: 33.3 GM/DL
MCV RBC AUTO: 97.3 FL
MONOCYTES # BLD AUTO: 0.65 K/UL
MONOCYTES NFR BLD AUTO: 10 %
NEUTROPHILS # BLD AUTO: 4.26 K/UL
NEUTROPHILS NFR BLD AUTO: 65.7 %
NONHDLC SERPL-MCNC: 96 MG/DL
PLATELET # BLD AUTO: 279 K/UL
POTASSIUM SERPL-SCNC: 4.3 MMOL/L
PROT SERPL-MCNC: 6.9 G/DL
RBC # BLD: 4.11 M/UL
RBC # FLD: 11.9 %
SODIUM SERPL-SCNC: 137 MMOL/L
TRIGL SERPL-MCNC: 340 MG/DL
TSH SERPL-ACNC: 0.92 UIU/ML
WBC # FLD AUTO: 6.49 K/UL

## 2022-12-05 ENCOUNTER — APPOINTMENT (OUTPATIENT)
Dept: HEART AND VASCULAR | Facility: CLINIC | Age: 82
End: 2022-12-05

## 2022-12-05 VITALS
BODY MASS INDEX: 20.66 KG/M2 | TEMPERATURE: 98.3 F | WEIGHT: 120.99 LBS | DIASTOLIC BLOOD PRESSURE: 66 MMHG | SYSTOLIC BLOOD PRESSURE: 116 MMHG | OXYGEN SATURATION: 95 % | HEIGHT: 64 IN | HEART RATE: 85 BPM

## 2022-12-05 PROCEDURE — 36415 COLL VENOUS BLD VENIPUNCTURE: CPT

## 2022-12-05 PROCEDURE — 99213 OFFICE O/P EST LOW 20 MIN: CPT

## 2022-12-05 RX ORDER — TIMOLOL MALEATE 5 MG/ML
0.5 SOLUTION OPHTHALMIC
Qty: 15 | Refills: 0 | Status: ACTIVE | COMMUNITY
Start: 2021-12-07

## 2022-12-05 RX ORDER — AMOXICILLIN 500 MG/1
500 CAPSULE ORAL
Qty: 21 | Refills: 0 | Status: ACTIVE | COMMUNITY
Start: 2022-09-15

## 2022-12-05 RX ORDER — ACETAMINOPHEN EXTRA STRENGTH 500 MG/1
500 TABLET ORAL
Qty: 35 | Refills: 0 | Status: ACTIVE | COMMUNITY
Start: 2022-07-26

## 2022-12-05 RX ORDER — IBUPROFEN 600 MG/1
600 TABLET, FILM COATED ORAL
Qty: 20 | Refills: 0 | Status: ACTIVE | COMMUNITY
Start: 2022-07-26

## 2022-12-05 RX ORDER — CHLORHEXIDINE GLUCONATE, 0.12% ORAL RINSE 1.2 MG/ML
0.12 SOLUTION DENTAL
Qty: 473 | Refills: 0 | Status: ACTIVE | COMMUNITY
Start: 2022-09-15

## 2022-12-05 NOTE — PROCEDURE
[Tc-99m, sestamibi-Cardiolite, to 40mCi, dose-Radionuclides-[employer] code--v.1-Active-Trade] : Tc-99m, sestamibi-Cardiolite, to 40mCi, dose-Radionuclides-[employer] code--v.1-Active-Trade [ECG Atrial Arrhythmias] : yes [Normal] : no significant [de-identified] : Dr. Chan Rodriguez [de-identified] : Wilber Coulter [de-identified] : SOBOE, DM(II), HTN, HLD. [de-identified] : 10.0 [de-identified] : 27.8.cailin [de-identified] : 66 [de-identified] : 160/82 [de-identified] : 6:05 [de-identified] : 183 [de-identified] : 192/76 [de-identified] : APCs and VPCs [de-identified] : 100 [de-identified] : 64& @ rest, 69% post exercise [de-identified] : Patient motion noted [TWNoteComboBox1] : HAMIDA [TWNoteComboBox2] : Edwin [TWNoteComboBox4] : fatigue [TWNoteComboBox3] : 3 [TWNoteComboBox5] : dyspnea [TWNoteComboBox6] : 1.0 - 1.5 [TWNoteComboBox7] : horizontal ST depression

## 2022-12-05 NOTE — OBJECTIVE
[History reviewed] : History reviewed. [Medications and Allergies reviewed] : Medications and allergies reviewed. Female

## 2022-12-05 NOTE — ASSESSMENT
[FreeTextEntry1] : SOBOE- s/p +CPET but NL MPI, HR goes very high, I favor a BB but pt defers.  It would also be good for his palps due to APCs and VPCs.  Pt reports that the SOB is much better in the cold weather.   More fatigue, new Tw changes in V 4-6.  favor updating Nuc.  He also wants to join a gym.  NL Nuc Aug 2019.  Doing well.\par \par HTN-  Low, will reduce diuretic to QOD.  BP excellent. Will increase back to daily for elevated BP.  Many questions answered today, pt also reassured.  Will hold diuretic every Sunday\par \par HLD- Continue statin\par \par T2D- On Metformin from MD at [facility], dose reduced.  Labs were drawn today in Office. \par \par Pt reports he is now anemic and getting w/u at [facility] for weakness.  I have no results yet he is asking questions about tests he had.  I told him he checks out well here but I need his test results before I can answer any questions, very frustrating visit for me.  Resolved. He will switch care to [facility].

## 2022-12-05 NOTE — HISTORY OF PRESENT ILLNESS
[FreeTextEntry1] : formerly a pt of Dr Tan\par \par Ophtho- Dr Cho\par PCP- Dr Clayton\par GI Dr Brayden Marquis colonoscopy 2016\par Neuro-  Dr Campbell @ [facility]  ? hydrocephalus\par LEONARDO- Dr Yu\par - Dr Quevedo

## 2022-12-06 LAB
ALBUMIN SERPL ELPH-MCNC: 4.8 G/DL
ALP BLD-CCNC: 67 U/L
ALT SERPL-CCNC: 18 U/L
ANION GAP SERPL CALC-SCNC: 14 MMOL/L
AST SERPL-CCNC: 21 U/L
BASOPHILS # BLD AUTO: 0.05 K/UL
BASOPHILS NFR BLD AUTO: 0.8 %
BILIRUB SERPL-MCNC: 0.4 MG/DL
BUN SERPL-MCNC: 37 MG/DL
CALCIUM SERPL-MCNC: 10.5 MG/DL
CHLORIDE SERPL-SCNC: 99 MMOL/L
CHOLEST SERPL-MCNC: 151 MG/DL
CO2 SERPL-SCNC: 23 MMOL/L
CREAT SERPL-MCNC: 1.1 MG/DL
EGFR: 67 ML/MIN/1.73M2
EOSINOPHIL # BLD AUTO: 0.13 K/UL
EOSINOPHIL NFR BLD AUTO: 2.1 %
ESTIMATED AVERAGE GLUCOSE: 120 MG/DL
GLUCOSE SERPL-MCNC: 102 MG/DL
HBA1C MFR BLD HPLC: 5.8 %
HCT VFR BLD CALC: 39 %
HDLC SERPL-MCNC: 60 MG/DL
HGB BLD-MCNC: 12.4 G/DL
IMM GRANULOCYTES NFR BLD AUTO: 0.2 %
LDLC SERPL CALC-MCNC: 65 MG/DL
LYMPHOCYTES # BLD AUTO: 1.36 K/UL
LYMPHOCYTES NFR BLD AUTO: 22.3 %
MAN DIFF?: NORMAL
MCHC RBC-ENTMCNC: 30.1 PG
MCHC RBC-ENTMCNC: 31.8 GM/DL
MCV RBC AUTO: 94.7 FL
MONOCYTES # BLD AUTO: 0.6 K/UL
MONOCYTES NFR BLD AUTO: 9.9 %
NEUTROPHILS # BLD AUTO: 3.94 K/UL
NEUTROPHILS NFR BLD AUTO: 64.7 %
NONHDLC SERPL-MCNC: 91 MG/DL
PLATELET # BLD AUTO: 323 K/UL
POTASSIUM SERPL-SCNC: 4.5 MMOL/L
PROT SERPL-MCNC: 7.4 G/DL
RBC # BLD: 4.12 M/UL
RBC # FLD: 11.7 %
SODIUM SERPL-SCNC: 136 MMOL/L
TRIGL SERPL-MCNC: 131 MG/DL
WBC # FLD AUTO: 6.09 K/UL

## 2022-12-07 LAB
FERRITIN SERPL-MCNC: 15 NG/ML
FOLATE SERPL-MCNC: >20 NG/ML
IRON SATN MFR SERPL: 10 %
IRON SERPL-MCNC: 50 UG/DL
TIBC SERPL-MCNC: 483 UG/DL
UIBC SERPL-MCNC: 432 UG/DL
VIT B12 SERPL-MCNC: 388 PG/ML

## 2023-06-08 ENCOUNTER — RX RENEWAL (OUTPATIENT)
Age: 83
End: 2023-06-08

## 2023-09-13 ENCOUNTER — APPOINTMENT (OUTPATIENT)
Dept: HEART AND VASCULAR | Facility: CLINIC | Age: 83
End: 2023-09-13
Payer: MEDICARE

## 2023-09-13 ENCOUNTER — NON-APPOINTMENT (OUTPATIENT)
Age: 83
End: 2023-09-13

## 2023-09-13 VITALS
DIASTOLIC BLOOD PRESSURE: 72 MMHG | HEIGHT: 64 IN | RESPIRATION RATE: 14 BRPM | HEART RATE: 96 BPM | SYSTOLIC BLOOD PRESSURE: 114 MMHG | WEIGHT: 129 LBS | OXYGEN SATURATION: 97 % | BODY MASS INDEX: 22.02 KG/M2 | TEMPERATURE: 99.2 F

## 2023-09-13 PROCEDURE — 99214 OFFICE O/P EST MOD 30 MIN: CPT

## 2023-09-13 PROCEDURE — 93000 ELECTROCARDIOGRAM COMPLETE: CPT

## 2023-09-13 PROCEDURE — G0405: CPT

## 2023-09-13 PROCEDURE — 36415 COLL VENOUS BLD VENIPUNCTURE: CPT

## 2023-09-14 LAB
ALBUMIN SERPL ELPH-MCNC: 5 G/DL
ALP BLD-CCNC: 63 U/L
ALT SERPL-CCNC: 16 U/L
ANION GAP SERPL CALC-SCNC: 14 MMOL/L
AST SERPL-CCNC: 19 U/L
BASOPHILS # BLD AUTO: 0.04 K/UL
BASOPHILS NFR BLD AUTO: 0.6 %
BILIRUB SERPL-MCNC: 0.4 MG/DL
BUN SERPL-MCNC: 29 MG/DL
CALCIUM SERPL-MCNC: 10.3 MG/DL
CHLORIDE SERPL-SCNC: 100 MMOL/L
CHOLEST SERPL-MCNC: 155 MG/DL
CO2 SERPL-SCNC: 26 MMOL/L
CREAT SERPL-MCNC: 1.56 MG/DL
EGFR: 44 ML/MIN/1.73M2
EOSINOPHIL # BLD AUTO: 0.11 K/UL
EOSINOPHIL NFR BLD AUTO: 1.7 %
ESTIMATED AVERAGE GLUCOSE: 134 MG/DL
FERRITIN SERPL-MCNC: 16 NG/ML
GLUCOSE SERPL-MCNC: 98 MG/DL
HBA1C MFR BLD HPLC: 6.3 %
HCT VFR BLD CALC: 37.7 %
HDLC SERPL-MCNC: 54 MG/DL
HGB BLD-MCNC: 12.3 G/DL
IMM GRANULOCYTES NFR BLD AUTO: 0.2 %
LDLC SERPL CALC-MCNC: 54 MG/DL
LYMPHOCYTES # BLD AUTO: 1.58 K/UL
LYMPHOCYTES NFR BLD AUTO: 24.2 %
MAN DIFF?: NORMAL
MCHC RBC-ENTMCNC: 29.3 PG
MCHC RBC-ENTMCNC: 32.6 GM/DL
MCV RBC AUTO: 89.8 FL
MONOCYTES # BLD AUTO: 0.84 K/UL
MONOCYTES NFR BLD AUTO: 12.9 %
NEUTROPHILS # BLD AUTO: 3.94 K/UL
NEUTROPHILS NFR BLD AUTO: 60.4 %
NONHDLC SERPL-MCNC: 101 MG/DL
PLATELET # BLD AUTO: 260 K/UL
POTASSIUM SERPL-SCNC: 4.1 MMOL/L
PROT SERPL-MCNC: 7.2 G/DL
RBC # BLD: 4.2 M/UL
RBC # FLD: 14.5 %
SODIUM SERPL-SCNC: 140 MMOL/L
TRIGL SERPL-MCNC: 310 MG/DL
WBC # FLD AUTO: 6.52 K/UL

## 2023-10-09 ENCOUNTER — APPOINTMENT (OUTPATIENT)
Dept: HEART AND VASCULAR | Facility: CLINIC | Age: 83
End: 2023-10-09
Payer: MEDICARE

## 2023-10-09 PROCEDURE — 93306 TTE W/DOPPLER COMPLETE: CPT

## 2024-02-26 ENCOUNTER — RX RENEWAL (OUTPATIENT)
Age: 84
End: 2024-02-26

## 2024-02-26 RX ORDER — ENALAPRIL MALEATE 10 MG/1
10 TABLET ORAL
Qty: 90 | Refills: 3 | Status: ACTIVE | COMMUNITY
Start: 2017-05-16 | End: 1900-01-01

## 2024-03-05 ENCOUNTER — RX RENEWAL (OUTPATIENT)
Age: 84
End: 2024-03-05

## 2024-03-05 RX ORDER — ATORVASTATIN CALCIUM 20 MG/1
20 TABLET, FILM COATED ORAL
Qty: 90 | Refills: 3 | Status: ACTIVE | COMMUNITY
Start: 2017-05-16 | End: 1900-01-01

## 2024-03-28 ENCOUNTER — APPOINTMENT (OUTPATIENT)
Dept: HEART AND VASCULAR | Facility: CLINIC | Age: 84
End: 2024-03-28
Payer: MEDICARE

## 2024-03-28 VITALS
SYSTOLIC BLOOD PRESSURE: 123 MMHG | HEART RATE: 84 BPM | TEMPERATURE: 97.8 F | WEIGHT: 123.05 LBS | OXYGEN SATURATION: 98 % | DIASTOLIC BLOOD PRESSURE: 76 MMHG | HEIGHT: 66.5 IN | BODY MASS INDEX: 19.54 KG/M2

## 2024-03-28 DIAGNOSIS — I10 ESSENTIAL (PRIMARY) HYPERTENSION: ICD-10-CM

## 2024-03-28 DIAGNOSIS — R94.31 ABNORMAL ELECTROCARDIOGRAM [ECG] [EKG]: ICD-10-CM

## 2024-03-28 DIAGNOSIS — E11.9 TYPE 2 DIABETES MELLITUS W/OUT COMPLICATIONS: ICD-10-CM

## 2024-03-28 DIAGNOSIS — D64.9 ANEMIA, UNSPECIFIED: ICD-10-CM

## 2024-03-28 DIAGNOSIS — E78.00 PURE HYPERCHOLESTEROLEMIA, UNSPECIFIED: ICD-10-CM

## 2024-03-28 PROCEDURE — 36415 COLL VENOUS BLD VENIPUNCTURE: CPT

## 2024-03-28 PROCEDURE — 99214 OFFICE O/P EST MOD 30 MIN: CPT

## 2024-03-28 RX ORDER — TIMOLOL MALEATE 5 MG/ML
0.5 SOLUTION OPHTHALMIC
Qty: 5 | Refills: 0 | Status: DISCONTINUED | COMMUNITY
Start: 2017-08-02 | End: 2024-03-28

## 2024-03-28 NOTE — PROCEDURE
[Tc-99m, sestamibi-Cardiolite, to 40mCi, dose-Radionuclides-[employer] code--v.1-Active-Trade] : Tc-99m, sestamibi-Cardiolite, to 40mCi, dose-Radionuclides-[employer] code--v.1-Active-Trade [ECG Atrial Arrhythmias] : yes [Normal] : no significant [de-identified] : Wilber Coulter [de-identified] : Dr. Chan Rodriguez [de-identified] : SOBOE, DM(II), HTN, HLD. [de-identified] : 10.0 [de-identified] : 27.8.cailin [de-identified] : 160/82 [de-identified] : 66 [de-identified] : 6:05 [de-identified] : 183 [de-identified] : 192/76 [de-identified] : APCs and VPCs [de-identified] : 64& @ rest, 69% post exercise [de-identified] : 100 [TWNoteComboBox1] : HAMIDA [de-identified] : Patient motion noted [TWNoteComboBox4] : fatigue [TWNoteComboBox2] : Edwin [TWNoteComboBox3] : 3 [TWNoteComboBox5] : dyspnea [TWNoteComboBox6] : 1.0 - 1.5 [TWNoteComboBox7] : horizontal ST depression

## 2024-03-28 NOTE — ASSESSMENT
[FreeTextEntry1] : SOBOE- s/p +CPET but NL MPI, HR goes very high, I favor a BB but pt defers.  It would also be good for his palps due to APCs and VPCs.  Pt reports that the SOB is much better in the cold weather.   More fatigue, new Tw changes in V 4-6.  favor updating Nuc.  He also wants to join a gym.  NL Nuc Aug 2019.  Doing well.  Murmur-much louder, will echo.  4 mild leaky valves.  Murmur-much louder, will echo  HTN-  Low, will reduce diuretic to QOD.  BP excellent. Will increase back to daily for elevated BP.  Many questions answered today, pt also reassured.  Will hold diuretic every Sunday  HLD- Continue statin  T2D- On Metformin from MD at [facility], dose reduced.  Labs were drawn today in Office. A1c went up since Metformin was reduced by new PCP.  Labs were drawn today in Office.   Pt reports he is now anemic and getting w/u at [facility] for weakness.  I have no results yet he is asking questions about tests he had.  I told him he checks out well here but I need his test results before I can answer any questions, very frustrating visit for me.    s/p EGD, colonoscopy and capsule endoscopy performed, cecal lesion cauterized.

## 2024-03-28 NOTE — PHYSICAL EXAM
[Normal Appearance] : normal appearance [General Appearance - Well Developed] : well developed [Well Groomed] : well groomed [General Appearance - Well Nourished] : well nourished [No Deformities] : no deformities [General Appearance - In No Acute Distress] : no acute distress [Eyelids - No Xanthelasma] : the eyelids demonstrated no xanthelasmas [Normal Conjunctiva] : the conjunctiva exhibited no abnormalities [Normal Oral Mucosa] : normal oral mucosa [No Oral Cyanosis] : no oral cyanosis [No Oral Pallor] : no oral pallor [Normal Jugular Venous V Waves Present] : normal jugular venous V waves present [Normal Jugular Venous A Waves Present] : normal jugular venous A waves present [No Jugular Venous Avina A Waves] : no jugular venous avina A waves [Respiration, Rhythm And Depth] : normal respiratory rhythm and effort [Exaggerated Use Of Accessory Muscles For Inspiration] : no accessory muscle use [Auscultation Breath Sounds / Voice Sounds] : lungs were clear to auscultation bilaterally [Heart Sounds] : normal S1 and S2 [Heart Rate And Rhythm] : heart rate and rhythm were normal [Abdomen Soft] : soft [Abdomen Tenderness] : non-tender [Abdomen Mass (___ Cm)] : no abdominal mass palpated [Abnormal Walk] : normal gait [Gait - Sufficient For Exercise Testing] : the gait was sufficient for exercise testing [Nail Clubbing] : no clubbing of the fingernails [Cyanosis, Localized] : no localized cyanosis [Petechial Hemorrhages (___cm)] : no petechial hemorrhages [Skin Color & Pigmentation] : normal skin color and pigmentation [] : no rash [No Venous Stasis] : no venous stasis [Skin Lesions] : no skin lesions [No Skin Ulcers] : no skin ulcer [No Xanthoma] : no  xanthoma was observed [Oriented To Time, Place, And Person] : oriented to person, place, and time [Affect] : the affect was normal [Mood] : the mood was normal [No Anxiety] : not feeling anxious [FreeTextEntry1] : 3/6 CYNTHIA and 2/6 apical systolic murmur

## 2024-03-29 LAB
ALBUMIN SERPL ELPH-MCNC: 4.8 G/DL
ALP BLD-CCNC: 57 U/L
ALT SERPL-CCNC: 16 U/L
ANION GAP SERPL CALC-SCNC: 13 MMOL/L
AST SERPL-CCNC: 17 U/L
BASOPHILS # BLD AUTO: 0.04 K/UL
BASOPHILS NFR BLD AUTO: 0.8 %
BILIRUB SERPL-MCNC: 0.4 MG/DL
BUN SERPL-MCNC: 21 MG/DL
CALCIUM SERPL-MCNC: 10.4 MG/DL
CHLORIDE SERPL-SCNC: 100 MMOL/L
CHOLEST SERPL-MCNC: 151 MG/DL
CO2 SERPL-SCNC: 26 MMOL/L
CREAT SERPL-MCNC: 1.13 MG/DL
EGFR: 64 ML/MIN/1.73M2
EOSINOPHIL # BLD AUTO: 0.09 K/UL
EOSINOPHIL NFR BLD AUTO: 1.9 %
ESTIMATED AVERAGE GLUCOSE: 120 MG/DL
FERRITIN SERPL-MCNC: 21 NG/ML
GLUCOSE SERPL-MCNC: 115 MG/DL
HBA1C MFR BLD HPLC: 5.8 %
HCT VFR BLD CALC: 40.1 %
HDLC SERPL-MCNC: 59 MG/DL
HGB BLD-MCNC: 13.6 G/DL
IMM GRANULOCYTES NFR BLD AUTO: 0.4 %
IRON SATN MFR SERPL: 22 %
IRON SERPL-MCNC: 86 UG/DL
LDLC SERPL CALC-MCNC: 65 MG/DL
LYMPHOCYTES # BLD AUTO: 1.38 K/UL
LYMPHOCYTES NFR BLD AUTO: 28.4 %
MAN DIFF?: NORMAL
MCHC RBC-ENTMCNC: 32.5 PG
MCHC RBC-ENTMCNC: 33.9 GM/DL
MCV RBC AUTO: 95.7 FL
MONOCYTES # BLD AUTO: 0.52 K/UL
MONOCYTES NFR BLD AUTO: 10.7 %
NEUTROPHILS # BLD AUTO: 2.81 K/UL
NEUTROPHILS NFR BLD AUTO: 57.8 %
NONHDLC SERPL-MCNC: 92 MG/DL
PLATELET # BLD AUTO: 258 K/UL
POTASSIUM SERPL-SCNC: 3.9 MMOL/L
PROT SERPL-MCNC: 7 G/DL
RBC # BLD: 4.19 M/UL
RBC # FLD: 12.1 %
SODIUM SERPL-SCNC: 139 MMOL/L
TIBC SERPL-MCNC: 397 UG/DL
TRIGL SERPL-MCNC: 159 MG/DL
UIBC SERPL-MCNC: 311 UG/DL
WBC # FLD AUTO: 4.86 K/UL

## 2024-05-28 ENCOUNTER — RX RENEWAL (OUTPATIENT)
Age: 84
End: 2024-05-28

## 2024-05-28 RX ORDER — AMILORIDE HYDROCHLORIDE AND HYDROCHLOROTHIAZIDE 5; 50 MG/1; MG/1
5-50 TABLET ORAL
Qty: 90 | Refills: 3 | Status: ACTIVE | COMMUNITY
Start: 2018-11-15 | End: 1900-01-01

## 2024-06-05 RX ORDER — METFORMIN HYDROCHLORIDE 850 MG/1
850 TABLET, COATED ORAL
Qty: 180 | Refills: 3 | Status: ACTIVE | COMMUNITY
Start: 2016-12-12 | End: 1900-01-01

## 2024-09-10 ENCOUNTER — APPOINTMENT (OUTPATIENT)
Dept: HEART AND VASCULAR | Facility: CLINIC | Age: 84
End: 2024-09-10
Payer: MEDICARE

## 2024-09-10 ENCOUNTER — NON-APPOINTMENT (OUTPATIENT)
Age: 84
End: 2024-09-10

## 2024-09-10 VITALS
SYSTOLIC BLOOD PRESSURE: 120 MMHG | DIASTOLIC BLOOD PRESSURE: 64 MMHG | HEIGHT: 66.5 IN | WEIGHT: 119 LBS | HEART RATE: 72 BPM | BODY MASS INDEX: 18.9 KG/M2 | TEMPERATURE: 98.1 F | OXYGEN SATURATION: 98 %

## 2024-09-10 DIAGNOSIS — E11.9 TYPE 2 DIABETES MELLITUS W/OUT COMPLICATIONS: ICD-10-CM

## 2024-09-10 DIAGNOSIS — I20.9 ANGINA PECTORIS, UNSPECIFIED: ICD-10-CM

## 2024-09-10 DIAGNOSIS — I10 ESSENTIAL (PRIMARY) HYPERTENSION: ICD-10-CM

## 2024-09-10 DIAGNOSIS — E78.00 PURE HYPERCHOLESTEROLEMIA, UNSPECIFIED: ICD-10-CM

## 2024-09-10 PROCEDURE — 99214 OFFICE O/P EST MOD 30 MIN: CPT

## 2024-09-10 PROCEDURE — 36415 COLL VENOUS BLD VENIPUNCTURE: CPT

## 2024-09-10 PROCEDURE — 93000 ELECTROCARDIOGRAM COMPLETE: CPT

## 2024-09-10 NOTE — ASSESSMENT
[FreeTextEntry1] : SOBOE- s/p +CPET but NL MPI, HR goes very high, I favor a BB but pt defers.  It would also be good for his palps due to APCs and VPCs.  Pt reports that the SOB is much better in the cold weather.   More fatigue, new Tw changes in V 4-6.  favor updating Nuc.  He also wants to join a gym.  NL Nuc Aug 2019.  Doing well.  Murmur-much louder, will echo.  4 mild leaky valves.  Chest Pressure- c/w angina, favor Nuc  Murmur-much louder, will echo.  4 mild leaky valves.  HTN-  Low, will reduce diuretic to QOD.  BP excellent. Will increase back to daily for elevated BP.  Many questions answered today, pt also reassured.  Will hold diuretic every Sunday  HLD- Continue statin  T2D- On Metformin from MD at [facility], dose reduced.  Labs were drawn today in Office. A1c went up since Metformin was reduced by new PCP.  Labs were drawn today in Office.   Pt reports he is now anemic and getting w/u at [facility] for weakness.  I have no results yet he is asking questions about tests he had.  I told him he checks out well here but I need his test results before I can answer any questions, very frustrating visit for me.    s/p EGD, colonoscopy and capsule endoscopy performed, cecal lesion cauterized.

## 2024-09-10 NOTE — PROCEDURE
[Tc-99m, sestamibi-Cardiolite, to 40mCi, dose-Radionuclides-[employer] code--v.1-Active-Trade] : Tc-99m, sestamibi-Cardiolite, to 40mCi, dose-Radionuclides-[employer] code--v.1-Active-Trade [ECG Atrial Arrhythmias] : yes [Normal] : no significant [de-identified] : Dr. Chan Rodriguez [de-identified] : Wilber Coulter [de-identified] : SOBOE, DM(II), HTN, HLD. [de-identified] : 10.0 [de-identified] : 27.8.cailin [de-identified] : 66 [de-identified] : 160/82 [de-identified] : 6:05 [de-identified] : 183 [de-identified] : 192/76 [de-identified] : APCs and VPCs [de-identified] : 100 [de-identified] : 64& @ rest, 69% post exercise [de-identified] : Patient motion noted [TWNoteComboBox1] : HAMIDA [TWNoteComboBox2] : Edwin [TWNoteComboBox4] : fatigue [TWNoteComboBox3] : 3 [TWNoteComboBox5] : dyspnea [TWNoteComboBox6] : 1.0 - 1.5 [TWNoteComboBox7] : horizontal ST depression

## 2024-09-10 NOTE — HISTORY OF PRESENT ILLNESS
[FreeTextEntry1] : formerly a pt of Dr Britney Benavideso- Dr Cho PCP- Dr Monty Gill GI Dr Brayden Marquis colonoscopy 2016 Neuro-  Dr Campbell @ [facility]  ? hydrocephalus LEONARDO- Dr Yu - Dr Quevedo

## 2024-09-11 LAB
ALBUMIN SERPL ELPH-MCNC: 4.9 G/DL
ALP BLD-CCNC: 58 U/L
ALT SERPL-CCNC: 18 U/L
ANION GAP SERPL CALC-SCNC: 14 MMOL/L
AST SERPL-CCNC: 19 U/L
BASOPHILS # BLD AUTO: 0.02 K/UL
BASOPHILS NFR BLD AUTO: 0.3 %
BILIRUB SERPL-MCNC: 0.4 MG/DL
BUN SERPL-MCNC: 29 MG/DL
CALCIUM SERPL-MCNC: 10.7 MG/DL
CHLORIDE SERPL-SCNC: 99 MMOL/L
CHOLEST SERPL-MCNC: 156 MG/DL
CO2 SERPL-SCNC: 26 MMOL/L
CREAT SERPL-MCNC: 1.16 MG/DL
EGFR: 62 ML/MIN/1.73M2
EOSINOPHIL # BLD AUTO: 0.02 K/UL
EOSINOPHIL NFR BLD AUTO: 0.3 %
ESTIMATED AVERAGE GLUCOSE: 126 MG/DL
FERRITIN SERPL-MCNC: 56 NG/ML
GLUCOSE SERPL-MCNC: 105 MG/DL
HBA1C MFR BLD HPLC: 6 %
HCT VFR BLD CALC: 42 %
HDLC SERPL-MCNC: 60 MG/DL
HGB BLD-MCNC: 13.5 G/DL
IMM GRANULOCYTES NFR BLD AUTO: 0.3 %
IRON SATN MFR SERPL: 26 %
IRON SERPL-MCNC: 90 UG/DL
LDLC SERPL CALC-MCNC: 61 MG/DL
LYMPHOCYTES # BLD AUTO: 1.47 K/UL
LYMPHOCYTES NFR BLD AUTO: 20.6 %
MAN DIFF?: NORMAL
MCHC RBC-ENTMCNC: 31.8 PG
MCHC RBC-ENTMCNC: 32.1 GM/DL
MCV RBC AUTO: 99.1 FL
MONOCYTES # BLD AUTO: 0.72 K/UL
MONOCYTES NFR BLD AUTO: 10.1 %
NEUTROPHILS # BLD AUTO: 4.88 K/UL
NEUTROPHILS NFR BLD AUTO: 68.4 %
NONHDLC SERPL-MCNC: 96 MG/DL
PLATELET # BLD AUTO: 268 K/UL
POTASSIUM SERPL-SCNC: 4.2 MMOL/L
PROT SERPL-MCNC: 7.3 G/DL
RBC # BLD: 4.24 M/UL
RBC # FLD: 12.3 %
SODIUM SERPL-SCNC: 140 MMOL/L
TIBC SERPL-MCNC: 350 UG/DL
TRIGL SERPL-MCNC: 220 MG/DL
UIBC SERPL-MCNC: 260 UG/DL
WBC # FLD AUTO: 7.13 K/UL

## 2024-10-01 ENCOUNTER — APPOINTMENT (OUTPATIENT)
Dept: HEART AND VASCULAR | Facility: CLINIC | Age: 84
End: 2024-10-01
Payer: MEDICARE

## 2024-10-01 PROCEDURE — A9500: CPT

## 2024-10-01 PROCEDURE — 78452 HT MUSCLE IMAGE SPECT MULT: CPT

## 2024-10-01 PROCEDURE — 93015 CV STRESS TEST SUPVJ I&R: CPT

## 2024-12-03 ENCOUNTER — RX RENEWAL (OUTPATIENT)
Age: 84
End: 2024-12-03

## 2024-12-24 PROBLEM — F10.90 ALCOHOL USE: Status: ACTIVE | Noted: 2017-09-08

## 2025-02-20 ENCOUNTER — RX RENEWAL (OUTPATIENT)
Age: 85
End: 2025-02-20

## 2025-02-24 ENCOUNTER — RX RENEWAL (OUTPATIENT)
Age: 85
End: 2025-02-24

## 2025-06-24 ENCOUNTER — APPOINTMENT (OUTPATIENT)
Dept: HEART AND VASCULAR | Facility: CLINIC | Age: 85
End: 2025-06-24
Payer: MEDICARE

## 2025-06-24 VITALS
OXYGEN SATURATION: 97 % | WEIGHT: 120.04 LBS | DIASTOLIC BLOOD PRESSURE: 82 MMHG | SYSTOLIC BLOOD PRESSURE: 136 MMHG | HEART RATE: 76 BPM | HEIGHT: 66.5 IN | TEMPERATURE: 98.1 F | BODY MASS INDEX: 19.06 KG/M2

## 2025-06-24 PROCEDURE — 99214 OFFICE O/P EST MOD 30 MIN: CPT

## 2025-06-24 PROCEDURE — 36415 COLL VENOUS BLD VENIPUNCTURE: CPT

## 2025-06-24 RX ORDER — VIT C/E/ZN/COPPR/LUTEIN/ZEAXAN 250MG-90MG
CAPSULE ORAL
Refills: 0 | Status: ACTIVE | COMMUNITY

## 2025-06-24 RX ORDER — OMEPRAZOLE 20 MG/1
20 CAPSULE, DELAYED RELEASE ORAL DAILY
Refills: 0 | Status: ACTIVE | COMMUNITY

## 2025-06-25 LAB
ALBUMIN SERPL ELPH-MCNC: 4.7 G/DL
ALP BLD-CCNC: 79 U/L
ALT SERPL-CCNC: 21 U/L
ANION GAP SERPL CALC-SCNC: 19 MMOL/L
AST SERPL-CCNC: 28 U/L
BASOPHILS # BLD AUTO: 0.03 K/UL
BASOPHILS NFR BLD AUTO: 0.6 %
BILIRUB SERPL-MCNC: 0.4 MG/DL
BUN SERPL-MCNC: 26 MG/DL
CALCIUM SERPL-MCNC: 10.2 MG/DL
CHLORIDE SERPL-SCNC: 100 MMOL/L
CHOLEST SERPL-MCNC: 148 MG/DL
CO2 SERPL-SCNC: 20 MMOL/L
CREAT SERPL-MCNC: 1.14 MG/DL
EGFRCR SERPLBLD CKD-EPI 2021: 63 ML/MIN/1.73M2
EOSINOPHIL # BLD AUTO: 0.05 K/UL
EOSINOPHIL NFR BLD AUTO: 1 %
ESTIMATED AVERAGE GLUCOSE: 117 MG/DL
GLUCOSE SERPL-MCNC: 99 MG/DL
HBA1C MFR BLD HPLC: 5.7 %
HCT VFR BLD CALC: 40.1 %
HDLC SERPL-MCNC: 56 MG/DL
HGB BLD-MCNC: 13.2 G/DL
IMM GRANULOCYTES NFR BLD AUTO: 0.2 %
LDLC SERPL-MCNC: 67 MG/DL
LYMPHOCYTES # BLD AUTO: 1.35 K/UL
LYMPHOCYTES NFR BLD AUTO: 27.2 %
MAN DIFF?: NORMAL
MCHC RBC-ENTMCNC: 32.1 PG
MCHC RBC-ENTMCNC: 32.9 G/DL
MCV RBC AUTO: 97.6 FL
MONOCYTES # BLD AUTO: 0.57 K/UL
MONOCYTES NFR BLD AUTO: 11.5 %
NEUTROPHILS # BLD AUTO: 2.96 K/UL
NEUTROPHILS NFR BLD AUTO: 59.5 %
NONHDLC SERPL-MCNC: 92 MG/DL
PLATELET # BLD AUTO: 225 K/UL
POTASSIUM SERPL-SCNC: 4.1 MMOL/L
PROT SERPL-MCNC: 7.2 G/DL
RBC # BLD: 4.11 M/UL
RBC # FLD: 12.1 %
SODIUM SERPL-SCNC: 139 MMOL/L
TRIGL SERPL-MCNC: 142 MG/DL
TSH SERPL-ACNC: 1.64 UIU/ML
VIT B12 SERPL-MCNC: 377 PG/ML
WBC # FLD AUTO: 4.97 K/UL

## 2025-07-01 ENCOUNTER — APPOINTMENT (OUTPATIENT)
Dept: HEART AND VASCULAR | Facility: CLINIC | Age: 85
End: 2025-07-01
Payer: MEDICARE

## 2025-07-01 VITALS — SYSTOLIC BLOOD PRESSURE: 124 MMHG | DIASTOLIC BLOOD PRESSURE: 80 MMHG

## 2025-07-01 PROCEDURE — 93306 TTE W/DOPPLER COMPLETE: CPT

## 2025-07-03 NOTE — ASU PATIENT PROFILE, ADULT - NS PREOP UNDERSTANDS INFO
No solids/dairy/ chewing gum/candy after MN. Water/clear apple juice only 3 hours before the procedure, after that NPO, No smoking or drinking alcohol the night before. Bring photo ID, insurance card, escort needs to be 18 years or older and have photo ID.  No jewelry , no makeup, no creams or lotion, no powders, no contact lenses . Wear comfortable, easy to manage clothing. Address and phone number given./yes

## 2025-07-10 ENCOUNTER — APPOINTMENT (OUTPATIENT)
Dept: CT IMAGING | Facility: HOSPITAL | Age: 85
End: 2025-07-10

## 2025-07-10 ENCOUNTER — OUTPATIENT (OUTPATIENT)
Dept: OUTPATIENT SERVICES | Facility: HOSPITAL | Age: 85
LOS: 1 days | End: 2025-07-10
Payer: MEDICARE

## 2025-07-10 PROCEDURE — 75574 CT ANGIO HRT W/3D IMAGE: CPT | Mod: 26

## 2025-07-10 PROCEDURE — 75574 CT ANGIO HRT W/3D IMAGE: CPT

## 2025-07-10 PROCEDURE — 82565 ASSAY OF CREATININE: CPT

## 2025-07-11 ENCOUNTER — OUTPATIENT (OUTPATIENT)
Dept: OUTPATIENT SERVICES | Facility: HOSPITAL | Age: 85
LOS: 1 days | End: 2025-07-11
Payer: MEDICARE

## 2025-07-11 ENCOUNTER — RESULT REVIEW (OUTPATIENT)
Age: 85
End: 2025-07-11

## 2025-07-11 PROCEDURE — 75580 N-INVAS EST C FFR SW ALY CTA: CPT

## 2025-07-11 PROCEDURE — 75580 N-INVAS EST C FFR SW ALY CTA: CPT | Mod: 26
